# Patient Record
Sex: FEMALE | Race: WHITE | NOT HISPANIC OR LATINO | Employment: FULL TIME | URBAN - METROPOLITAN AREA
[De-identification: names, ages, dates, MRNs, and addresses within clinical notes are randomized per-mention and may not be internally consistent; named-entity substitution may affect disease eponyms.]

---

## 2019-07-01 ENCOUNTER — OFFICE VISIT (OUTPATIENT)
Dept: ENDOCRINOLOGY | Facility: CLINIC | Age: 39
End: 2019-07-01

## 2019-07-01 VITALS
BODY MASS INDEX: 25.95 KG/M2 | OXYGEN SATURATION: 99 % | HEART RATE: 81 BPM | WEIGHT: 141 LBS | HEIGHT: 62 IN | DIASTOLIC BLOOD PRESSURE: 70 MMHG | SYSTOLIC BLOOD PRESSURE: 104 MMHG

## 2019-07-01 DIAGNOSIS — E05.90 SUBCLINICAL HYPERTHYROIDISM: ICD-10-CM

## 2019-07-01 DIAGNOSIS — E04.2 MULTINODULAR GOITER: Primary | ICD-10-CM

## 2019-07-01 PROCEDURE — 84439 ASSAY OF FREE THYROXINE: CPT | Performed by: INTERNAL MEDICINE

## 2019-07-01 PROCEDURE — 99244 OFF/OP CNSLTJ NEW/EST MOD 40: CPT | Performed by: INTERNAL MEDICINE

## 2019-07-01 PROCEDURE — 84443 ASSAY THYROID STIM HORMONE: CPT | Performed by: INTERNAL MEDICINE

## 2019-07-01 PROCEDURE — 76536 US EXAM OF HEAD AND NECK: CPT | Performed by: INTERNAL MEDICINE

## 2019-07-01 PROCEDURE — 84480 ASSAY TRIIODOTHYRONINE (T3): CPT | Performed by: INTERNAL MEDICINE

## 2019-07-01 RX ORDER — CLONAZEPAM 0.5 MG/1
0.5 TABLET ORAL DAILY PRN
Refills: 0 | COMMUNITY
Start: 2019-06-10

## 2019-07-01 NOTE — PROGRESS NOTES
Thyroid Ultrasound Report  [facility]  [place], [place]    Date: 07/01/2019  Indication: multinodular goiter  Comparison Imaging: none  Clinical History: 39 y.o. Female with h/o subclinical hyperthyroidism and multinodular goiter. TSH suppressed to <0.005, freeT4 and T3 were normal.      Real time high resolution imaging of the thyroid gland was performed in transverse and longitudinal planes.  The right lobe measured 7.62 cm in Length x 2.97 cm in   AP diameter x 2.97 cm in TV dimension.  The isthmus measured 0.19 cm in thickness.  The left thyroid lobe measured 4.12 cm in length x 1.45 cm in AP diameter x 1.20 cm in TV dimension.    The thyroid gland appeared overall isoechoic with heterogeneous echotexture.    In the right mid to superior lobe, a 3.79(L) x 2.16(AP) x 3.18(T) cm isoechoic spongiform nodule was identified. The nodule had a smooth margin, peripheral and intranodal vascularity, and no microcalcifications.     In the right mid to inferior lobe, a 3.64(L) x 2.49(AP) x 2.61(T) cm isoechoic mostly solid nodule with a few small cystic areas at the periphery was identified. The nodule had a smooth margin, minimal peripheral vascularity, and no microcalcifications.     No discrete nodules were identified in the left lobe or isthmus.   No pathologic lymph nodes were seen.     IMPRESSION:   1) Asymmetric, mildly enlarged thyroid gland with the right lobe enlarged and the left lobe normal in size. Findings were consistent with a multinodular goiter.   2) No discrete nodules were identified in the left lobe or isthmus.  3) In the right mid to superior lobe, a 3.79(L) x 2.16(AP) x 3.18(T) cm isoechoic spongiform nodule was identified. The nodule had a smooth margin, peripheral and intranodal vascularity, and no microcalcifications. TI-Rads score of 1.   4) In the right mid to inferior lobe, a 3.64(L) x 2.49(AP) x 2.61(T) cm isoechoic mostly solid nodule was identified. The nodule had a smooth  margin, minimal peripheral vascularity, and no microcalcifications. TI-Rads score of 3.     RECOMMENDATIONS: Although the right mid to inferior lobe nodule meets criteria for FNA based on size, FNA would not be recommended if the nodule is autonomous. Based on the suppressed TSH, recommended NM Thyroid uptake and scan to further characterize the nodules.

## 2019-07-01 NOTE — PROGRESS NOTES
Chief Complaint   Patient presents with   • Subclinical hyperthyroidism     Consult for Luci COREAS        HPI:   Madai Olmos is a 39 y.o.female sent in consultation by LYUDMILA Cardenas for further evaluation of pt's subclinical hyperthyroidism. Her history is as follows:    - pt had a TSH collected in 01/2018 that she reports was slightly decreased. Pt was taking a high dose biotin supplement at that time. Her TSH was noted to be normal in 2017  - She then present to her PCP with increased anxiety in 01/2019. Evaluation included TFT's that were consistent with subclinical hyperthyroidism: (01/30/2019) TSH 0.01, free T4 1.3 (0.8 - 1.8). Pt recalls no biotin at that time, no URI or other illness.   - no imaging was completed at that time    - she continued to have anxiety, depression and OCD and was eventually prescribed clonazepam 0.5 mg BID  - Her PCP is concerned that her subclinical hyperthyroidism may be contributing to her anxiety    FMH: no known thyroid cancer, no known thyroid disease. (Grandmother had PHPTH)  PMH: no h/o irradiation of head, neck, or chest    Review of Systems   Constitutional:        Weight gain   HENT: Negative.    Eyes: Negative.    Respiratory: Negative.    Cardiovascular: Negative.    Gastrointestinal: Negative.    Endocrine: Positive for heat intolerance.   Genitourinary: Negative.    Musculoskeletal: Positive for myalgias.   Skin: Negative.    Allergic/Immunologic: Negative.    Neurological: Positive for weakness and numbness. Negative for tremors.   Hematological: Negative.    Psychiatric/Behavioral: Negative.  Negative for suicidal ideas.        Depression, anxiety     Past Medical History:   Diagnosis Date   • Depression    • Heart murmur    • Migraine    • OCD (obsessive compulsive disorder)    • Panic anxiety syndrome      family history includes Arthritis in her maternal grandmother; Cancer in her maternal great-grandmother, mother, paternal grandmother, and sister;  "Diabetes in her maternal grandfather; Heart attack in her maternal grandfather and paternal grandfather; Hyperlipidemia in her father; Hypertension in her maternal great-grandmother, paternal grandfather, and paternal grandmother; Mental illness in her brother, mother, and sister; Migraines in her brother, mother, and sister; Osteoporosis in her maternal grandmother and paternal grandmother; Thyroid disease in her maternal grandmother.  Past Surgical History:   Procedure Laterality Date   • MOUTH SURGERY  2003    x 2 2011   • TUBAL ABDOMINAL LIGATION  10/2009     Social History     Tobacco Use   • Smoking status: Former Smoker   • Smokeless tobacco: Never Used   Substance Use Topics   • Alcohol use: Yes     Alcohol/week: 0.6 oz     Types: 1 Glasses of wine per week   • Drug use: No     Outpatient Medications Prior to Visit   Medication Sig Dispense Refill   • clonazePAM (KlonoPIN) 0.5 MG tablet Take 0.5 mg by mouth 2 (Two) Times a Day As Needed.  0     No facility-administered medications prior to visit.      Allergies   Allergen Reactions   • Keflex [Cephalexin] Diarrhea     Patient states that she gets migraines with dizziness and tongue swelling        /70   Pulse 81   Ht 156.2 cm (61.5\")   Wt 64 kg (141 lb)   SpO2 99%   BMI 26.21 kg/m²   Physical Exam   Constitutional: She is oriented to person, place, and time. She appears well-developed. No distress.   HENT:   Head: Normocephalic.   Mouth/Throat: Oropharynx is clear and moist.   Eyes: Conjunctivae and EOM are normal. Pupils are equal, round, and reactive to light.   Neck: No tracheal deviation present. Thyromegaly (right lobe enlarged, left lobe small) present.   No palpable thyroid nodules     Cardiovascular: Normal rate, regular rhythm and normal heart sounds.   No murmur heard.  Pulmonary/Chest: Effort normal and breath sounds normal. No respiratory distress.   Abdominal: Soft. Bowel sounds are normal. She exhibits no mass. There is no " tenderness.   Lymphadenopathy:     She has no cervical adenopathy.   Neurological: She is alert and oriented to person, place, and time. No cranial nerve deficit.   Skin: Skin is warm and dry. She is not diaphoretic. No erythema.   Psychiatric: She has a normal mood and affect. Her behavior is normal.   Vitals reviewed.    LABS/IMAGING: outside records reviewed and summarized in HPI  Results for orders placed or performed in visit on 07/01/19   T3   Result Value Ref Range    T3, Total 167.0 80.0 - 200.0 ng/dl   T4, Free   Result Value Ref Range    Free T4 1.49 0.93 - 1.70 ng/dL   TSH   Result Value Ref Range    TSH <0.005 (L) 0.270 - 4.200 mIU/mL     PROCEDURES (in office):    Thyroid Ultrasound Report  [facility]  [place], [place]    Date: 07/01/2019  Indication: multinodular goiter  Comparison Imaging: none  Clinical History: 39 y.o. Female with h/o subclinical hyperthyroidism and multinodular goiter. TSH suppressed to <0.005, freeT4 and T3 were normal.      Real time high resolution imaging of the thyroid gland was performed in transverse and longitudinal planes.  The right lobe measured 7.62 cm in Length x 2.97 cm in   AP diameter x 2.97 cm in TV dimension.  The isthmus measured 0.19 cm in thickness.  The left thyroid lobe measured 4.12 cm in length x 1.45 cm in AP diameter x 1.20 cm in TV dimension.    The thyroid gland appeared overall isoechoic with heterogeneous echotexture.    In the right mid to superior lobe, a 3.79(L) x 2.16(AP) x 3.18(T) cm isoechoic spongiform nodule was identified. The nodule had a smooth margin, peripheral and intranodal vascularity, and no microcalcifications.     In the right mid to inferior lobe, a 3.64(L) x 2.49(AP) x 2.61(T) cm isoechoic mostly solid nodule with a few small cystic areas at the periphery was identified. The nodule had a smooth margin, minimal peripheral vascularity, and no microcalcifications.     No discrete nodules were identified in the left  lobe or isthmus.   No pathologic lymph nodes were seen.     IMPRESSION:   1) Asymmetric, mildly enlarged thyroid gland with the right lobe enlarged and the left lobe normal in size. Findings were consistent with a multinodular goiter.   2) No discrete nodules were identified in the left lobe or isthmus.  3) In the right mid to superior lobe, a 3.79(L) x 2.16(AP) x 3.18(T) cm isoechoic spongiform nodule was identified. The nodule had a smooth margin, peripheral and intranodal vascularity, and no microcalcifications. TI-Rads score of 1.   4) In the right mid to inferior lobe, a 3.64(L) x 2.49(AP) x 2.61(T) cm isoechoic mostly solid nodule was identified. The nodule had a smooth margin, minimal peripheral vascularity, and no microcalcifications. TI-Rads score of 3.     RECOMMENDATIONS: Although the right mid to inferior lobe nodule meets criteria for FNA based on size, FNA would not be recommended if the nodule is autonomous. Based on the suppressed TSH, recommended NM Thyroid uptake and scan to further characterize the nodules.       ASSESSMENT/PLAN:  1) multinodular goiter: Thyroid US completed in clinic today showed -   - Asymmetric, mildly enlarged thyroid gland with the right lobe enlarged and the left lobe normal in size. Findings were consistent with a multinodular goiter.   - No discrete nodules were identified in the left lobe or isthmus.  - In the right mid to superior lobe, a 3.79(L) x 2.16(AP) x 3.18(T) cm isoechoic spongiform nodule was identified. The nodule had a smooth margin, peripheral and intranodal vascularity, and no microcalcifications. TI-Rads score of 1.   - In the right mid to inferior lobe, a 3.64(L) x 2.49(AP) x 2.61(T) cm isoechoic mostly solid nodule was identified. The nodule had a smooth margin, minimal peripheral vascularity, and no microcalcifications. TI-Rads score of 3.     RECOMMENDATIONS: Although the right mid to inferior lobe nodule meets criteria for FNA based on size, FNA would  not be recommended if the nodule is autonomous. Based on the suppressed TSH, recommended NM Thyroid uptake and scan to further characterize the nodules.     - Will have patient complete a NM thyroid uptake and scan. After review of imaging, will determine in FNA is indicated.     2) subclinical hyperthyroidism:   - labs checked today again show subclinical hyperthyroidism (suppressed TSH with a normal fT4 and total T3)  - the etiology is multinodular goiter  - discussed with patient that it is unlikely her subclinical hyperthyroidism is contributing to her anxiety. However, a trial of methimazole could be considered to see if symptoms improve with normalization of her TSH. Will have pt complete the NM thyroid uptake and scan first. After review of imaging, will then be able to determine if a trial of methimazole is possible.      RTC 6 months    Counseling was given to patient for the following topics:  diagnostic results and impressions and clinical significance of thyroid nodules, risk factors for thyroid cancer, indications for FNA, and reviewing US images with patient   , see details in assessment/plan. Total face to face time of the encounter was 60 minutes and 45 minutes was spent counseling.

## 2019-07-02 LAB
T3 SERPL-MCNC: 167 NG/DL (ref 80–200)
T4 FREE SERPL-MCNC: 1.49 NG/DL (ref 0.93–1.7)
TSH SERPL DL<=0.05 MIU/L-ACNC: <0.005 MIU/ML (ref 0.27–4.2)

## 2019-07-18 ENCOUNTER — TELEPHONE (OUTPATIENT)
Dept: ENDOCRINOLOGY | Facility: CLINIC | Age: 39
End: 2019-07-18

## 2019-07-24 PROBLEM — E04.2 MULTINODULAR GOITER: Status: ACTIVE | Noted: 2019-07-24

## 2019-07-24 PROBLEM — E05.90 SUBCLINICAL HYPERTHYROIDISM: Status: ACTIVE | Noted: 2019-07-24

## 2019-08-07 ENCOUNTER — HOSPITAL ENCOUNTER (OUTPATIENT)
Dept: NUCLEAR MEDICINE | Facility: HOSPITAL | Age: 39
Discharge: HOME | End: 2019-08-07

## 2019-08-07 DIAGNOSIS — E05.90 SUBCLINICAL HYPERTHYROIDISM: ICD-10-CM

## 2019-08-07 DIAGNOSIS — E04.2 MULTINODULAR GOITER: ICD-10-CM

## 2019-08-07 PROCEDURE — 0 SODIUM IODIDE 3.7 MBQ CAPSULE: Performed by: INTERNAL MEDICINE

## 2019-08-07 PROCEDURE — 78014 THYROID IMAGING W/BLOOD FLOW: CPT

## 2019-08-07 PROCEDURE — A9516 IODINE I-123 SOD IODIDE MIC: HCPCS | Performed by: INTERNAL MEDICINE

## 2019-08-07 RX ORDER — SODIUM IODIDE I 123 100 UCI/1
1 CAPSULE, GELATIN COATED ORAL
Status: COMPLETED | OUTPATIENT
Start: 2019-08-07 | End: 2019-08-07

## 2019-08-07 RX ADMIN — Medication 1 CAPSULE: at 12:20

## 2019-08-08 ENCOUNTER — HOSPITAL ENCOUNTER (OUTPATIENT)
Dept: NUCLEAR MEDICINE | Facility: HOSPITAL | Age: 39
Discharge: HOME | End: 2019-08-08

## 2019-08-19 ENCOUNTER — TELEPHONE (OUTPATIENT)
Dept: INTERNAL MEDICINE | Facility: CLINIC | Age: 39
End: 2019-08-19

## 2019-08-19 NOTE — TELEPHONE ENCOUNTER
PATIENT RECEIVED AN EMAIL FROM THE HOSPITAL IN REGARDS TO HER UPTAKE THAT SHE HAD DONE AND SHE WAS WONDERING IF DR FOLEY HAD REVIEW THE RESULTS YET AND WHEN WOULD SHE LIKE FOR HER TO COME BACK IN FOR AN APPOINTMENT? THE PATIENT WOULD LIKE TO GET A CALL BACK [phone]

## 2019-08-19 NOTE — TELEPHONE ENCOUNTER
Spoke to patient about NM thyroid uptake and scan results. 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 After discussion of options, will monitor for now. No medication at this time.  Ritu Marino MD

## 2020-01-13 ENCOUNTER — OFFICE VISIT (OUTPATIENT)
Dept: ENDOCRINOLOGY | Facility: CLINIC | Age: 40
End: 2020-01-13

## 2020-01-13 VITALS
OXYGEN SATURATION: 98 % | SYSTOLIC BLOOD PRESSURE: 110 MMHG | HEART RATE: 95 BPM | HEIGHT: 62 IN | BODY MASS INDEX: 25.03 KG/M2 | DIASTOLIC BLOOD PRESSURE: 80 MMHG | WEIGHT: 136 LBS

## 2020-01-13 DIAGNOSIS — E04.2 MULTINODULAR GOITER: ICD-10-CM

## 2020-01-13 DIAGNOSIS — E05.90 SUBCLINICAL HYPERTHYROIDISM: Primary | ICD-10-CM

## 2020-01-13 LAB
T3 SERPL-MCNC: 167 NG/DL (ref 80–200)
T4 FREE SERPL-MCNC: 1.32 NG/DL (ref 0.93–1.7)
TSH SERPL DL<=0.05 MIU/L-ACNC: 0.01 UIU/ML (ref 0.27–4.2)

## 2020-01-13 PROCEDURE — 84480 ASSAY TRIIODOTHYRONINE (T3): CPT | Performed by: INTERNAL MEDICINE

## 2020-01-13 PROCEDURE — 84439 ASSAY OF FREE THYROXINE: CPT | Performed by: INTERNAL MEDICINE

## 2020-01-13 PROCEDURE — 84443 ASSAY THYROID STIM HORMONE: CPT | Performed by: INTERNAL MEDICINE

## 2020-01-13 PROCEDURE — 99213 OFFICE O/P EST LOW 20 MIN: CPT | Performed by: INTERNAL MEDICINE

## 2020-01-13 NOTE — PROGRESS NOTES
Chief Complaint   Patient presents with   • Multinodular goiter     f/u    • subclinical hyperthyroidism     f/u       HPI:   Madai Olmos is a 39 y.o.female who returns to Endocrine Clinic for f/u evaluation of pt's subclinical hyperthyroidism and multinodular goiter. Last visit 07/01/2019. Her history is as follows:    Interim Events:   - pt stopped her Zoloft prescription  - pt now taking clonazepam as needed  - she reports her OCD, anxiety has improved since 07/2019 without new any medication intervention     1) subclinical hyperthyroidism:  - pt had a TSH collected in 01/2018 that she reports was slightly decreased. Pt was taking a high dose biotin supplement at that time. Her TSH was noted to be normal in 2017  - She then presented to her PCP with increased anxiety in 01/2019. Evaluation included TFT's that were consistent with subclinical hyperthyroidism: (01/30/2019) TSH 0.01, free T4 1.3 (0.8 - 1.8). Pt recalls no biotin at that time, no URI or other illness.   - no imaging was completed at that time    - she continued to have anxiety, depression and OCD and was eventually prescribed clonazepam 0.5 mg BID. Had been on Zoloft in the past  - Her PCP was concerned that her subclinical hyperthyroidism was contributing to her anxiety prompting consultation.    Initial Endocrine Visit: (07/01/2019):  - labs checked again showed subclinical hyperthyroidism (TSH < 0.005, normal free T4 of 1.49 and total T3 of 167.0)  - the etiology was found to be multinodular goiter  - discussed with patient that it is unlikely her subclinical hyperthyroidism was contributing to her anxiety. However, a trial of methimazole could be considered to see if symptoms improve with normalization of her TSH.   - pt did not want to take methimazole and reported her symptoms had been slowly improving with the clonazepam.    2) non-obstructive MNG:    Thyroid US completed in clinic (07/2019) showed -   - Asymmetric, mildly enlarged thyroid  "gland with the right lobe enlarged and the left lobe normal in size. Findings were consistent with a multinodular goiter.   - No discrete nodules were identified in the left lobe or isthmus.  - In the right mid to superior lobe, a 3.79(L) x 2.16(AP) x 3.18(T) cm isoechoic spongiform nodule was identified. The nodule had a smooth margin, peripheral and intranodal vascularity, and no microcalcifications. TI-Rads score of 1.   - In the right mid to inferior lobe, a 3.64(L) x 2.49(AP) x 2.61(T) cm isoechoic mostly solid nodule was identified. The nodule had a smooth margin, minimal peripheral vascularity, and no microcalcifications. TI-Rads score of 3.   RECOMMENDATIONS: Although the right mid to inferior lobe nodule meets criteria for FNA based on size, FNA would not be recommended if the nodule is autonomous. Based on the suppressed TSH, recommended NM Thyroid uptake and scan to further characterize the nodules.     NM Thyroid Uptake and Scan (08/08/2019,  radiology): \"1. Focal uptake in the right thyroid lobe and suppressed appearance of  left thyroid lobe.2. Four hour thyroid uptake at the far upper limits of normal, this combination of findings suggest toxic nodular goiter.\"  \"Thyroid uptake is at the far upper limits of normal for 4 hours at 20%,and mid range normal at 24 hours at 27%\"    FMH: no known thyroid cancer, no known thyroid disease. (Grandmother had PHPTH)  PMH: no h/o irradiation of head, neck, or chest    Review of Systems   Constitutional:        Weight loss, mild   HENT: Negative.    Eyes: Negative.    Respiratory: Negative.    Cardiovascular: Negative.    Gastrointestinal: Negative.    Endocrine: Negative.  Negative for heat intolerance.   Genitourinary: Negative.    Musculoskeletal: Positive for myalgias.   Skin: Negative.    Allergic/Immunologic: Negative.    Neurological: Positive for weakness and numbness. Negative for tremors.   Hematological: Negative.    Psychiatric/Behavioral: Negative.  " "Negative for suicidal ideas.        Depression, anxiety: improving     The following portions of the patient's history were reviewed and updated as appropriate: allergies, current medications, past family history, past medical history, past social history, past surgical history and problem list.    /80   Pulse 95   Ht 156.2 cm (61.5\")   Wt 61.7 kg (136 lb)   SpO2 98%   BMI 25.28 kg/m²   Physical Exam   Constitutional: She is oriented to person, place, and time. She appears well-developed. No distress.   HENT:   Head: Normocephalic.   Mouth/Throat: Oropharynx is clear and moist.   Eyes: Pupils are equal, round, and reactive to light. Conjunctivae and EOM are normal.   Neck: No tracheal deviation present. Thyromegaly (right lobe enlarged, left lobe small) present.   No palpable thyroid nodules     Cardiovascular: Normal rate, regular rhythm and normal heart sounds.   No murmur heard.  Pulmonary/Chest: Effort normal and breath sounds normal. No respiratory distress.   Abdominal: Soft. Bowel sounds are normal. She exhibits no mass. There is no tenderness.   Lymphadenopathy:     She has no cervical adenopathy.   Neurological: She is alert and oriented to person, place, and time. No cranial nerve deficit.   Skin: Skin is warm and dry. She is not diaphoretic. No erythema.   Psychiatric: She has a normal mood and affect. Her behavior is normal.   Vitals reviewed.    LABS/IMAGING: outside records reviewed and summarized in HPI  Results for orders placed or performed in visit on 01/13/20   TSH   Result Value Ref Range    TSH 0.006 (L) 0.270 - 4.200 uIU/mL   T4, Free   Result Value Ref Range    Free T4 1.32 0.93 - 1.70 ng/dL   T3   Result Value Ref Range    T3, Total 167.0 80.0 - 200.0 ng/dl     ASSESSMENT/PLAN:   1) subclinical hyperthyroidism:   - labs checked today again show subclinical hyperthyroidism (suppressed TSH with a normal fT4 and total T3)  - the etiology is multinodular goiter as seen on Thyroid US " and Thyroid uptake and scan in 2019  - discussed with patient that it is unlikely her subclinical hyperthyroidism is contributing to her anxiety. Pt also reports feeling better since last visit and has tapered her anxiety medications.   - recommended yearly TSH, free T4, and total T3 (off of biotin supplements) to monitor for progression  - Reviewed signs and symptoms of overt hyperthyroidism and instructed her to call if she develops concerning symptoms so that labs can be completed.     2) multinodular goiter:   - Thyroid US completed in clinic 07/2019 reviewed   - NM Thyroid uptake and scan from 08/2019 reviewed.   - Findings show that the right lobe nodules are hyperfunctioning.  FNA is not be recommended as these nodules are autonomously functioning.   - Will plan to repeat Thyroid US in 2021    RTC 12 months

## 2020-01-27 ENCOUNTER — TELEPHONE (OUTPATIENT)
Dept: ENDOCRINOLOGY | Facility: CLINIC | Age: 40
End: 2020-01-27

## 2021-05-19 ENCOUNTER — OFFICE VISIT (OUTPATIENT)
Dept: ENDOCRINOLOGY | Facility: CLINIC | Age: 41
End: 2021-05-19

## 2021-05-19 ENCOUNTER — LAB (OUTPATIENT)
Dept: LAB | Facility: HOSPITAL | Age: 41
End: 2021-05-19

## 2021-05-19 VITALS
SYSTOLIC BLOOD PRESSURE: 126 MMHG | HEART RATE: 92 BPM | BODY MASS INDEX: 22.66 KG/M2 | HEIGHT: 61 IN | OXYGEN SATURATION: 99 % | DIASTOLIC BLOOD PRESSURE: 80 MMHG | WEIGHT: 120 LBS

## 2021-05-19 DIAGNOSIS — E05.20 TOXIC NODULAR GOITER W/O CRISIS: Primary | ICD-10-CM

## 2021-05-19 PROCEDURE — 84480 ASSAY TRIIODOTHYRONINE (T3): CPT | Performed by: INTERNAL MEDICINE

## 2021-05-19 PROCEDURE — 84439 ASSAY OF FREE THYROXINE: CPT | Performed by: INTERNAL MEDICINE

## 2021-05-19 PROCEDURE — 99214 OFFICE O/P EST MOD 30 MIN: CPT | Performed by: INTERNAL MEDICINE

## 2021-05-19 PROCEDURE — 83520 IMMUNOASSAY QUANT NOS NONAB: CPT | Performed by: INTERNAL MEDICINE

## 2021-05-19 PROCEDURE — 84443 ASSAY THYROID STIM HORMONE: CPT | Performed by: INTERNAL MEDICINE

## 2021-05-19 RX ORDER — FAMOTIDINE 20 MG/1
20 TABLET, FILM COATED ORAL DAILY PRN
COMMUNITY
Start: 2021-05-03 | End: 2022-03-23

## 2021-05-20 LAB
T3 SERPL-MCNC: 162 NG/DL (ref 80–200)
T4 FREE SERPL-MCNC: 1.73 NG/DL (ref 0.93–1.7)
TSH SERPL DL<=0.05 MIU/L-ACNC: <0.005 UIU/ML (ref 0.27–4.2)

## 2021-05-21 ENCOUNTER — TELEPHONE (OUTPATIENT)
Dept: ENDOCRINOLOGY | Facility: CLINIC | Age: 41
End: 2021-05-21

## 2021-05-21 RX ORDER — METHIMAZOLE 5 MG/1
5 TABLET ORAL DAILY
Qty: 90 TABLET | Refills: 1 | Status: SHIPPED | OUTPATIENT
Start: 2021-05-21 | End: 2021-08-18

## 2021-05-21 NOTE — TELEPHONE ENCOUNTER
Spoke to patient about lab results. See clinic note from 05/19/2021 for details.   Signed: Ritu Marino MD

## 2021-05-23 LAB — TSH RECEP AB SER-ACNC: <1.1 IU/L (ref 0–1.75)

## 2021-06-01 DIAGNOSIS — E05.20 TOXIC NODULAR GOITER W/O CRISIS: Primary | ICD-10-CM

## 2021-06-02 ENCOUNTER — LAB (OUTPATIENT)
Dept: LAB | Facility: HOSPITAL | Age: 41
End: 2021-06-02

## 2021-06-02 ENCOUNTER — LAB (OUTPATIENT)
Dept: ENDOCRINOLOGY | Facility: CLINIC | Age: 41
End: 2021-06-02

## 2021-06-02 DIAGNOSIS — E05.20 TOXIC NODULAR GOITER W/O CRISIS: ICD-10-CM

## 2021-06-02 LAB
T4 FREE SERPL-MCNC: 1.11 NG/DL (ref 0.93–1.7)
TSH SERPL DL<=0.05 MIU/L-ACNC: 0.01 UIU/ML (ref 0.27–4.2)

## 2021-06-02 PROCEDURE — 84439 ASSAY OF FREE THYROXINE: CPT

## 2021-06-02 PROCEDURE — 84443 ASSAY THYROID STIM HORMONE: CPT

## 2021-06-21 NOTE — PROGRESS NOTES
Thyroid Ultrasound Report  [facility]  [place], [place]    Date: 05/19/2021  Indication: multinodular goiter  Comparison Imaging: Endocrine Clinic Thyroid US (07/01/2019), NM Thyroid Uptake and scan (08/08/2019)  Clinical History: 40 y.o. Female with h/o subclinical hyperthyroidism and multinodular goiter. NM Thyroid uptake ans scan showing autonomous nodules in the right lobs. Labs today show overt hyperthyroidism.    Real time high resolution imaging of the thyroid gland was performed in transverse and longitudinal planes. The right lobe measured 8.15 cm in Length x 3.08 cm in AP diameter x 3.28 cm in TV dimension.  The isthmus measured 0.23 cm in thickness.  The left thyroid lobe measured 4.24 cm in length x 1.57 cm in AP diameter x 1.23 cm in TV dimension.    The thyroid gland appeared overall isoechoic with heterogeneous echotexture.    In the right mid to superior lobe, a 3.96(L) x 2.07(AP) x 2.97(T) cm isoechoic spongiform nodule was again identified. The nodule had a smooth margin, peripheral and intranodal vascularity, and no microcalcifications.     In the right mid to inferior lobe, a 3.84(L) x 3.11(AP) x 3.01(T) cm isoechoic mostly solid nodule with a few small cystic areas at the periphery was again identified. The nodule had a smooth margin, peripheral vascularity, and no microcalcifications.     No discrete nodules were identified in the isthmus.    A subcentimeter (0.56 cm) isoechoic mixed solid and cystic nodule was identified. The nodule had a smooth margin, no vascularity, and no microcalcifications.     No pathologic lymph nodes were seen.     IMPRESSION:   1) Asymmetric, mildly enlarged thyroid gland with the right lobe enlarged and the left lobe normal in size. Findings were consistent with a multinodular goiter.   2) A subcentimeter (0.56 cm) isoechoic mixed solid and cystic nodule was identified.  3) In the right mid to superior lobe, a 3.96(L) x 2.07(AP) x 2.97(T) cm isoechoic  spongiform nodule was again identified. The nodule had a smooth margin, peripheral and intranodal vascularity, and no microcalcifications. TI-Rads score of 1.   4) In the right mid to inferior lobe, a 3.84(L) x 3.11(AP) x 3.01(T) cm isoechoic mostly solid nodule with a few small cystic areas at the periphery was again identified. The nodule had a smooth margin, peripheral vascularity, and no microcalcifications.  TI-Rads score of 3.     RECOMMENDATIONS: Although the right mid to inferior lobe nodule meets criteria for FNA based on size, FNA was not recommended as the nodule is autonomous.

## 2021-06-21 NOTE — PROGRESS NOTES
Chief Complaint   Patient presents with   • Thyroid Nodule     Follow up       HPI:   Madai Olmos is a 40 y.o.female who returns to Endocrine Clinic for f/u evaluation of pt's subclinical hyperthyroidism and multinodular goiter. Last visit 01/13/2020. Her history is as follows:    Interim Events:   - pt reports developing palpitations and tachycardia.  Her apple watch reported possible atrial fibrillation.  She presented to an outside ER with tachycardia and chest pain. Was found to have sinus tachycardia.   - Had been vaping and recently stopped.   - is scheduled to see cardiology for further evaluation     1) h/o subclinical hyperthyroidism:  - pt had a TSH collected in 01/2018 that she reports was slightly decreased. Pt was taking a high dose biotin supplement at that time. Her TSH was noted to be normal in 2017  - She then presented to her PCP with increased anxiety in 01/2019. Evaluation included TFT's that were consistent with subclinical hyperthyroidism: (01/30/2019) TSH 0.01, free T4 1.3 (0.8 - 1.8). Pt recalls no biotin at that time, no URI or other illness.   - no imaging was completed at that time    - she continued to have anxiety, depression and OCD and was eventually prescribed clonazepam 0.5 mg BID. Had been on Zoloft in the past  - Her PCP was concerned that her subclinical hyperthyroidism was contributing to her anxiety prompting consultation.    Initial Endocrine Visit: (07/01/2019):  - labs checked again showed subclinical hyperthyroidism (TSH < 0.005, normal free T4 of 1.49 and total T3 of 167.0)  - the etiology was found to be multinodular goiter  - discussed with patient that it is unlikely her subclinical hyperthyroidism was contributing to her anxiety. However, a trial of methimazole could be considered to see if symptoms improve with normalization of her TSH.   - pt did not want to take methimazole and reported her symptoms had been slowly improving with the clonazepam.    2)  "non-obstructive MNG:    Thyroid US completed in clinic (07/2019) showed -   - Asymmetric, mildly enlarged thyroid gland with the right lobe enlarged and the left lobe normal in size. Findings were consistent with a multinodular goiter.   - No discrete nodules were identified in the left lobe or isthmus.  - In the right mid to superior lobe, a 3.79(L) x 2.16(AP) x 3.18(T) cm isoechoic spongiform nodule was identified. The nodule had a smooth margin, peripheral and intranodal vascularity, and no microcalcifications. TI-Rads score of 1.   - In the right mid to inferior lobe, a 3.64(L) x 2.49(AP) x 2.61(T) cm isoechoic mostly solid nodule was identified. The nodule had a smooth margin, minimal peripheral vascularity, and no microcalcifications. TI-Rads score of 3.   RECOMMENDATIONS: Although the right mid to inferior lobe nodule meets criteria for FNA based on size, FNA would not be recommended if the nodule is autonomous. Based on the suppressed TSH, recommended NM Thyroid uptake and scan to further characterize the nodules.     NM Thyroid Uptake and Scan (08/08/2019,  radiology): \"1. Focal uptake in the right thyroid lobe and suppressed appearance of  left thyroid lobe.2. Four hour thyroid uptake at the far upper limits of normal, this combination of findings suggest toxic nodular goiter.\"  \"Thyroid uptake is at the far upper limits of normal for 4 hours at 20%,and mid range normal at 24 hours at 27%\"    FMH: no known thyroid cancer, no known thyroid disease. (Grandmother had PHPTH)  PMH: no h/o irradiation of head, neck, or chest    Review of Systems   Constitutional: Positive for unexpected weight change.        Weight loss, 16 lbs since 01/2020   HENT: Negative.    Eyes: Negative.    Respiratory: Negative.    Cardiovascular: Positive for palpitations.   Gastrointestinal: Negative.    Endocrine: Negative.  Negative for heat intolerance.   Genitourinary: Negative.    Musculoskeletal: Positive for myalgias.   Skin: " "Negative.    Allergic/Immunologic: Negative.    Neurological: Positive for tremors.   Hematological: Negative.    Psychiatric/Behavioral: Negative.  Negative for suicidal ideas.        H/o Depression, anxiety     The following portions of the patient's history were reviewed and updated as appropriate: allergies, current medications, past family history, past medical history, past social history, past surgical history and problem list.    /80   Pulse 92   Ht 154.9 cm (61\")   Wt 54.4 kg (120 lb)   SpO2 99%   BMI 22.67 kg/m²   Physical Exam   Constitutional: She is oriented to person, place, and time. She appears well-developed. No distress.   HENT:   Head: Normocephalic.   Eyes: Pupils are equal, round, and reactive to light. Conjunctivae are normal.   Neck: No tracheal deviation present. Thyromegaly (right lobe enlarged, left lobe small) present.   No palpable thyroid nodules     Cardiovascular: Normal rate, regular rhythm and normal heart sounds.   No murmur heard.  Pulmonary/Chest: Effort normal and breath sounds normal. No respiratory distress.   Abdominal: Soft. Bowel sounds are normal. She exhibits no mass. There is no abdominal tenderness.   Lymphadenopathy:     She has no cervical adenopathy.   Neurological: She is alert and oriented to person, place, and time. No cranial nerve deficit.   Skin: Skin is warm and dry. She is not diaphoretic. No erythema.   Psychiatric: Her behavior is normal.   Vitals reviewed.    LABS/IMAGING: outside records reviewed and summarized in HPI  Results for orders placed or performed in visit on 05/19/21   Thyrotropin Receptor Antibody    Specimen: Blood   Result Value Ref Range    Thyrotropin Receptor Antibody <1.10 0.00 - 1.75 IU/L   TSH    Specimen: Blood   Result Value Ref Range    TSH <0.005 (L) 0.270 - 4.200 uIU/mL   T3    Specimen: Blood   Result Value Ref Range    T3, Total 162.0 80.0 - 200.0 ng/dl   T4, Free    Specimen: Blood   Result Value Ref Range    Free T4 " 1.73 (H) 0.93 - 1.70 ng/dL     PROCEDURE (in office):    Thyroid Ultrasound Report  [facility]  [place], [place]    Date: 05/19/2021  Indication: multinodular goiter  Comparison Imaging: Endocrine Clinic Thyroid US (07/01/2019), NM Thyroid Uptake and scan (08/08/2019)  Clinical History: 40 y.o. Female with h/o subclinical hyperthyroidism and multinodular goiter. NM Thyroid uptake ans scan showing autonomous nodules in the right lobs. Labs today show overt hyperthyroidism.    Real time high resolution imaging of the thyroid gland was performed in transverse and longitudinal planes. The right lobe measured 8.15 cm in Length x 3.08 cm in AP diameter x 3.28 cm in TV dimension.  The isthmus measured 0.23 cm in thickness.  The left thyroid lobe measured 4.24 cm in length x 1.57 cm in AP diameter x 1.23 cm in TV dimension.    The thyroid gland appeared overall isoechoic with heterogeneous echotexture.    In the right mid to superior lobe, a 3.96(L) x 2.07(AP) x 2.97(T) cm isoechoic spongiform nodule was again identified. The nodule had a smooth margin, peripheral and intranodal vascularity, and no microcalcifications.     In the right mid to inferior lobe, a 3.84(L) x 3.11(AP) x 3.01(T) cm isoechoic mostly solid nodule with a few small cystic areas at the periphery was again identified. The nodule had a smooth margin, peripheral vascularity, and no microcalcifications.     No discrete nodules were identified in the isthmus.    A subcentimeter (0.56 cm) isoechoic mixed solid and cystic nodule was identified. The nodule had a smooth margin, no vascularity, and no microcalcifications.     No pathologic lymph nodes were seen.     IMPRESSION:   1) Asymmetric, mildly enlarged thyroid gland with the right lobe enlarged and the left lobe normal in size. Findings were consistent with a multinodular goiter.   2) A subcentimeter (0.56 cm) isoechoic mixed solid and cystic nodule was identified.  3) In the right mid to  superior lobe, a 3.96(L) x 2.07(AP) x 2.97(T) cm isoechoic spongiform nodule was again identified. The nodule had a smooth margin, peripheral and intranodal vascularity, and no microcalcifications. TI-Rads score of 1.   4) In the right mid to inferior lobe, a 3.84(L) x 3.11(AP) x 3.01(T) cm isoechoic mostly solid nodule with a few small cystic areas at the periphery was again identified. The nodule had a smooth margin, peripheral vascularity, and no microcalcifications.  TI-Rads score of 3.     RECOMMENDATIONS: Although the right mid to inferior lobe nodule meets criteria for FNA based on size, FNA was not recommended as the nodule is autonomous.     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 TSH Receptor Ab is negative - no underlying Graves disease.    - discussed with patient that her recent tachycardia and palpitations are likely due to her toxic nodular goiter. Not clear if she is having Atrial fibrillation, but discussed that she is at risk for cardiac dysrhythmias with untreated hyperthyroidism.    Recommended she start methimazole 5 mg daily.     ADDENDUM: Pt took methimazole for only 2 weeks. She stopped on 06/02/2021 due to low heart rate in the 60's. Off methimazole pt reported low blood pressure as well as c/o night sweats, right arm/leg numbness.     TFT's repeated (06/02/2021) TSH 0.007, free T4 1.11.   Discussed with patient that usually toxic nodular goiter needs treatment for at least 6 months as she is at risk for developing overt hyperthyroidism again leading to cardiac dysrhythmia.  The majority of symptoms she experienced occurred off of the methimazole. Patient states she is very hesitant to take any medication and does not want to take the methimazole or  PTU since her levels are back in the subclinical range.  Reviewed with patient the signs and symptoms of overt hyperthyroidism.  Instructed patient to call if she develops the symptoms again so that her thyroid function levels can be checked.  Reviewed the risks of cardiac dysrhythmias in the setting of untreated hyperthyroidism.  including paroxysmal atrial fibrillation which would require anticoagulation       - Thyroid US completed in clinic 07/2019 reviewed   - NM Thyroid uptake and scan from 08/2019 reviewed.   - Findings show that the right lobe nodules are hyperfunctioning.  FNA is not be recommended as these nodules are autonomously functioning.     Repeat thyroid US completed today showed -   - Asymmetric, mildly enlarged thyroid gland with the right lobe enlarged and the left lobe normal in size. Findings were consistent with a multinodular goiter.   - A subcentimeter (0.56 cm) isoechoic mixed solid and cystic nodule was identified.  - In the right mid to superior lobe, a 3.96(L) x 2.07(AP) x 2.97(T) cm isoechoic spongiform nodule was again identified. The nodule had a smooth margin, peripheral and intranodal vascularity, and no microcalcifications. TI-Rads score of 1.   - In the right mid to inferior lobe, a 3.84(L) x 3.11(AP) x 3.01(T) cm isoechoic mostly solid nodule with a few small cystic areas at the periphery was again identified. The nodule had a smooth margin, peripheral vascularity, and no microcalcifications.  TI-Rads score of 3.     RECOMMENDATIONS: Although the right mid to inferior lobe nodule meets criteria for FNA based on size, FNA was not recommended as the nodule is autonomous.     RTC 3 months    Signed: Ritu Marino MD    Counseling was given to patient for the following topics:  impressions and risks and benefits of treatment options, see details in assessment/plan. Total face to face time of the encounter was 30 minutes and 20 minutes was spent counseling.

## 2021-08-18 ENCOUNTER — OFFICE VISIT (OUTPATIENT)
Dept: ENDOCRINOLOGY | Facility: CLINIC | Age: 41
End: 2021-08-18

## 2021-08-18 ENCOUNTER — LAB (OUTPATIENT)
Dept: LAB | Facility: HOSPITAL | Age: 41
End: 2021-08-18

## 2021-08-18 VITALS
OXYGEN SATURATION: 98 % | BODY MASS INDEX: 23.03 KG/M2 | WEIGHT: 122 LBS | HEIGHT: 61 IN | SYSTOLIC BLOOD PRESSURE: 118 MMHG | DIASTOLIC BLOOD PRESSURE: 70 MMHG | HEART RATE: 68 BPM

## 2021-08-18 DIAGNOSIS — E04.2 MULTINODULAR GOITER: ICD-10-CM

## 2021-08-18 DIAGNOSIS — E05.90 SUBCLINICAL HYPERTHYROIDISM: Primary | ICD-10-CM

## 2021-08-18 PROCEDURE — 99213 OFFICE O/P EST LOW 20 MIN: CPT | Performed by: INTERNAL MEDICINE

## 2021-08-18 PROCEDURE — 84443 ASSAY THYROID STIM HORMONE: CPT | Performed by: INTERNAL MEDICINE

## 2021-08-18 PROCEDURE — 84480 ASSAY TRIIODOTHYRONINE (T3): CPT | Performed by: INTERNAL MEDICINE

## 2021-08-18 PROCEDURE — 84439 ASSAY OF FREE THYROXINE: CPT | Performed by: INTERNAL MEDICINE

## 2021-08-19 LAB
T3 SERPL-MCNC: 183 NG/DL (ref 80–200)
T4 FREE SERPL-MCNC: 1.67 NG/DL (ref 0.93–1.7)
TSH SERPL DL<=0.05 MIU/L-ACNC: <0.005 UIU/ML (ref 0.27–4.2)

## 2021-10-11 DIAGNOSIS — E05.90 SUBCLINICAL HYPERTHYROIDISM: Primary | ICD-10-CM

## 2021-10-20 ENCOUNTER — LAB (OUTPATIENT)
Dept: LAB | Facility: HOSPITAL | Age: 41
End: 2021-10-20

## 2021-10-20 DIAGNOSIS — E05.90 SUBCLINICAL HYPERTHYROIDISM: ICD-10-CM

## 2021-10-20 LAB
T3 SERPL-MCNC: 199 NG/DL (ref 80–200)
T4 FREE SERPL-MCNC: 1.94 NG/DL (ref 0.93–1.7)
TSH SERPL DL<=0.05 MIU/L-ACNC: <0.005 UIU/ML (ref 0.27–4.2)

## 2021-10-20 PROCEDURE — 84480 ASSAY TRIIODOTHYRONINE (T3): CPT

## 2021-10-20 PROCEDURE — 84443 ASSAY THYROID STIM HORMONE: CPT

## 2021-10-20 PROCEDURE — 84439 ASSAY OF FREE THYROXINE: CPT

## 2021-10-22 ENCOUNTER — OFFICE VISIT (OUTPATIENT)
Dept: ENDOCRINOLOGY | Facility: CLINIC | Age: 41
End: 2021-10-22

## 2021-10-22 VITALS
DIASTOLIC BLOOD PRESSURE: 68 MMHG | HEART RATE: 82 BPM | SYSTOLIC BLOOD PRESSURE: 118 MMHG | HEIGHT: 61 IN | WEIGHT: 122.7 LBS | OXYGEN SATURATION: 97 % | BODY MASS INDEX: 23.17 KG/M2

## 2021-10-22 DIAGNOSIS — E05.20 TOXIC MULTINODULAR GOITER W/O CRISIS: Primary | ICD-10-CM

## 2021-10-22 PROBLEM — E05.90 SUBCLINICAL HYPERTHYROIDISM: Status: RESOLVED | Noted: 2019-07-24 | Resolved: 2021-10-22

## 2021-10-22 PROCEDURE — 99214 OFFICE O/P EST MOD 30 MIN: CPT | Performed by: INTERNAL MEDICINE

## 2021-10-22 RX ORDER — METHIMAZOLE 5 MG/1
2.5 TABLET ORAL NIGHTLY
Qty: 15 TABLET | Refills: 5 | Status: SHIPPED | OUTPATIENT
Start: 2021-10-22 | End: 2022-03-23 | Stop reason: SDUPTHER

## 2021-10-22 RX ORDER — CYCLOBENZAPRINE HCL 5 MG
5 TABLET ORAL 3 TIMES DAILY
COMMUNITY
Start: 2021-08-19 | End: 2021-10-22

## 2021-12-15 ENCOUNTER — LAB (OUTPATIENT)
Dept: LAB | Facility: HOSPITAL | Age: 41
End: 2021-12-15

## 2021-12-15 DIAGNOSIS — E05.20 TOXIC MULTINODULAR GOITER W/O CRISIS: ICD-10-CM

## 2021-12-15 LAB
T4 FREE SERPL-MCNC: 1 NG/DL (ref 0.93–1.7)
TSH SERPL DL<=0.05 MIU/L-ACNC: 0.11 UIU/ML (ref 0.27–4.2)

## 2021-12-15 PROCEDURE — 84443 ASSAY THYROID STIM HORMONE: CPT

## 2021-12-15 PROCEDURE — 84439 ASSAY OF FREE THYROXINE: CPT

## 2022-03-23 ENCOUNTER — OFFICE VISIT (OUTPATIENT)
Dept: ENDOCRINOLOGY | Facility: CLINIC | Age: 42
End: 2022-03-23

## 2022-03-23 ENCOUNTER — LAB (OUTPATIENT)
Dept: LAB | Facility: HOSPITAL | Age: 42
End: 2022-03-23

## 2022-03-23 VITALS
OXYGEN SATURATION: 99 % | HEART RATE: 76 BPM | SYSTOLIC BLOOD PRESSURE: 114 MMHG | DIASTOLIC BLOOD PRESSURE: 60 MMHG | BODY MASS INDEX: 24.92 KG/M2 | HEIGHT: 61 IN | WEIGHT: 132 LBS

## 2022-03-23 DIAGNOSIS — E05.20 TOXIC MULTINODULAR GOITER W/O CRISIS: Primary | ICD-10-CM

## 2022-03-23 PROCEDURE — 84439 ASSAY OF FREE THYROXINE: CPT | Performed by: INTERNAL MEDICINE

## 2022-03-23 PROCEDURE — 84480 ASSAY TRIIODOTHYRONINE (T3): CPT | Performed by: INTERNAL MEDICINE

## 2022-03-23 PROCEDURE — 99213 OFFICE O/P EST LOW 20 MIN: CPT | Performed by: INTERNAL MEDICINE

## 2022-03-23 PROCEDURE — 84443 ASSAY THYROID STIM HORMONE: CPT | Performed by: INTERNAL MEDICINE

## 2022-03-23 RX ORDER — METHIMAZOLE 5 MG/1
2.5 TABLET ORAL NIGHTLY
Qty: 25 TABLET | Refills: 5 | Status: SHIPPED | OUTPATIENT
Start: 2022-03-23 | End: 2023-03-06

## 2022-03-24 LAB
T3 SERPL-MCNC: 120 NG/DL (ref 80–200)
T4 FREE SERPL-MCNC: 1.18 NG/DL (ref 0.93–1.7)
TSH SERPL DL<=0.05 MIU/L-ACNC: 0.05 UIU/ML (ref 0.27–4.2)

## 2022-08-22 ENCOUNTER — OFFICE VISIT (OUTPATIENT)
Dept: ENDOCRINOLOGY | Facility: CLINIC | Age: 42
End: 2022-08-22

## 2022-08-22 ENCOUNTER — LAB (OUTPATIENT)
Dept: LAB | Facility: HOSPITAL | Age: 42
End: 2022-08-22

## 2022-08-22 VITALS
HEIGHT: 61 IN | SYSTOLIC BLOOD PRESSURE: 118 MMHG | OXYGEN SATURATION: 98 % | HEART RATE: 78 BPM | WEIGHT: 142 LBS | BODY MASS INDEX: 26.81 KG/M2 | DIASTOLIC BLOOD PRESSURE: 62 MMHG

## 2022-08-22 DIAGNOSIS — E05.20 TOXIC MULTINODULAR GOITER W/O CRISIS: Primary | ICD-10-CM

## 2022-08-22 PROCEDURE — 84439 ASSAY OF FREE THYROXINE: CPT | Performed by: INTERNAL MEDICINE

## 2022-08-22 PROCEDURE — 84480 ASSAY TRIIODOTHYRONINE (T3): CPT | Performed by: INTERNAL MEDICINE

## 2022-08-22 PROCEDURE — 84443 ASSAY THYROID STIM HORMONE: CPT | Performed by: INTERNAL MEDICINE

## 2022-08-22 PROCEDURE — 99213 OFFICE O/P EST LOW 20 MIN: CPT | Performed by: INTERNAL MEDICINE

## 2022-08-22 RX ORDER — DIPHENOXYLATE HYDROCHLORIDE AND ATROPINE SULFATE 2.5; .025 MG/1; MG/1
TABLET ORAL DAILY
COMMUNITY

## 2022-08-23 LAB
T3 SERPL-MCNC: 113 NG/DL (ref 80–200)
T4 FREE SERPL-MCNC: 0.99 NG/DL (ref 0.93–1.7)
TSH SERPL DL<=0.05 MIU/L-ACNC: 0.35 UIU/ML (ref 0.27–4.2)

## 2023-03-06 ENCOUNTER — OFFICE VISIT (OUTPATIENT)
Dept: ENDOCRINOLOGY | Facility: CLINIC | Age: 43
End: 2023-03-06
Payer: COMMERCIAL

## 2023-03-06 VITALS
OXYGEN SATURATION: 98 % | HEIGHT: 61 IN | SYSTOLIC BLOOD PRESSURE: 120 MMHG | WEIGHT: 145 LBS | BODY MASS INDEX: 27.38 KG/M2 | HEART RATE: 74 BPM | DIASTOLIC BLOOD PRESSURE: 74 MMHG

## 2023-03-06 DIAGNOSIS — E05.20 TOXIC MULTINODULAR GOITER W/O CRISIS: Primary | ICD-10-CM

## 2023-03-06 LAB
T3 SERPL-MCNC: 162 NG/DL (ref 80–200)
T4 FREE SERPL-MCNC: 1.47 NG/DL (ref 0.93–1.7)
TSH SERPL DL<=0.05 MIU/L-ACNC: <0.005 UIU/ML (ref 0.27–4.2)

## 2023-03-06 PROCEDURE — 84480 ASSAY TRIIODOTHYRONINE (T3): CPT | Performed by: INTERNAL MEDICINE

## 2023-03-06 PROCEDURE — 84439 ASSAY OF FREE THYROXINE: CPT | Performed by: INTERNAL MEDICINE

## 2023-03-06 PROCEDURE — 99213 OFFICE O/P EST LOW 20 MIN: CPT | Performed by: INTERNAL MEDICINE

## 2023-03-06 PROCEDURE — 84443 ASSAY THYROID STIM HORMONE: CPT | Performed by: INTERNAL MEDICINE

## 2023-03-06 NOTE — PROGRESS NOTES
Chief Complaint   Patient presents with   • Hyperthyroidism     Follow up        HPI:   Madai Olmos is a 42 y.o.female who returns to Endocrine Clinic for f/u evaluation of pt's hyperthyroidism due to toxic multinodular goiter. Last visit 08/22/2022. Her history is as follows:    Interim Events:   - pt took methimazole 2.5 mg from 10/2021 to 10/2022. Since stopping the methimazole in October, she has not experienced palpitations or increased anxiety from baseline.  - currently following an intermittent fasting diet plan     1) hyperthyroidism due to TMG:  - pt had a TSH collected in 01/2018 that she reports was slightly decreased. Pt was taking a high dose biotin supplement at that time. Her TSH was noted to be normal in 2017  - She then presented to her PCP with increased anxiety in 01/2019. Evaluation included TFT's that were consistent with subclinical hyperthyroidism: (01/30/2019) TSH 0.01, free T4 1.3 (0.8 - 1.8). Pt recalls no biotin at that time, no URI or other illness.   - no imaging was completed at that time  - she continued to have anxiety, depression and OCD and was eventually prescribed clonazepam 0.5 mg BID. Had been on Zoloft in the past  - Her PCP was concerned that her subclinical hyperthyroidism was contributing to her anxiety prompting consultation.    Initial Endocrine Visit: (07/01/2019):  - labs checked again showed subclinical hyperthyroidism (TSH < 0.005, normal free T4 of 1.49 and total T3 of 167.0)  - the etiology was found to be multinodular goiter  - discussed with patient that it is unlikely her subclinical hyperthyroidism was contributing to her anxiety. However, a trial of methimazole could be considered to see if symptoms improve with normalization of her TSH.   - pt did not want to take methimazole and reported her symptoms had been slowly improving with the clonazepam.    (05/19/2021) Pt presented symptomatic with palpitations and tachycardia. Labs checked showed overt, mild  hyperthyroidism (suppressed TSH <0.005, with an elevated Free T4 of 1.73 and normal total T3 162.0)  - the etiology was multinodular goiter as seen on Thyroid US and Thyroid uptake and scan in 2019. TSH Receptor Ab was negative - no underlying Graves disease.  - discussed with patient that her recent tachycardia and palpitations were due to her toxic nodular goiter. Discussed that she is at risk for cardiac dysrhythmias with untreated hyperthyroidism.Recommended she start methimazole 5 mg daily.   - Pt took methimazole for only 2 weeks. She stopped on 06/02/2021 due to low heart rate in the 60's. Off methimazole pt reported low blood pressure as well as c/o night sweats, right arm/leg numbness. TFT's repeated (06/02/2021) TSH 0.007, free T4 1.11.   (10/20/2021) - pt contacted me in mid October about developing worsening anxiety and palpitations at rest. Checked TFT's on 10/20/2021 which showed overt hyperthyroidism. TSH <0.005, free T4 1.94 (0.93 - 1.70), negative TSH Rec Ab. Started methimazole 2.5 mg nightly Mon - Friday  (10/2021 - 10/2022) - pt took methimazole 2.5 mg from 10/2021 to 10/2022. Since stopping the methimazole in October, she has not experienced palpitations or increased anxiety from baseline.       2) non-obstructive MNG:     Thyroid US completed in clinic (07/2019) showed -   - Asymmetric, mildly enlarged thyroid gland with the right lobe enlarged and the left lobe normal in size. Findings were consistent with a multinodular goiter.   - No discrete nodules were identified in the left lobe or isthmus.  - In the right mid to superior lobe, a 3.79(L) x 2.16(AP) x 3.18(T) cm isoechoic spongiform nodule was identified. The nodule had a smooth margin, peripheral and intranodal vascularity, and no microcalcifications. TI-Rads score of 1.   - In the right mid to inferior lobe, a 3.64(L) x 2.49(AP) x 2.61(T) cm isoechoic mostly solid nodule was identified. The nodule had a smooth margin, minimal peripheral  "vascularity, and no microcalcifications. TI-Rads score of 3.   RECOMMENDATIONS: Although the right mid to inferior lobe nodule meets criteria for FNA based on size, FNA would not be recommended if the nodule is autonomous. Based on the suppressed TSH, recommended NM Thyroid uptake and scan to further characterize the nodules.     NM Thyroid Uptake and Scan (08/08/2019,  radiology): \"1. Focal uptake in the right thyroid lobe and suppressed appearance of left thyroid lobe.2. Four hour thyroid uptake at the far upper limits of normal, this combination of findings suggest toxic nodular goiter.\"  \"Thyroid uptake is at the far upper limits of normal for 4 hours at 20%,and mid range normal at 24 hours at 27%\"     Thyroid US completed in clinic (05/2021) showed -   - Asymmetric, mildly enlarged thyroid gland with the right lobe enlarged and the left lobe normal in size. Findings were consistent with a multinodular goiter.   - A subcentimeter (0.56 cm) isoechoic mixed solid and cystic nodule was identified.  - In the right mid to superior lobe, a 3.96(L) x 2.07(AP) x 2.97(T) cm isoechoic spongiform nodule was again identified. The nodule had a smooth margin, peripheral and intranodal vascularity, and no microcalcifications. TI-Rads score of 1.   - In the right mid to inferior lobe, a 3.84(L) x 3.11(AP) x 3.01(T) cm isoechoic mostly solid nodule with a few small cystic areas at the periphery was again identified. The nodule had a smooth margin, peripheral vascularity, and no microcalcifications.  TI-Rads score of 3.   RECOMMENDATIONS: Although the right mid to inferior lobe nodule meets criteria for FNA based on size, FNA was not recommended as the nodule is autonomous.     FMH: no known thyroid cancer, no known thyroid disease. (Grandmother had PHPTH)  PMH: no h/o irradiation of head, neck, or chest    Review of Systems   Constitutional:        Wt stable   HENT: Negative.    Eyes: Negative.    Respiratory: Negative.  " "  Cardiovascular: Negative.  Negative for palpitations.   Gastrointestinal: Negative.         H/o Frequent heartburn   Endocrine: Negative.         Occasional night sweats in the last 4 wks   Genitourinary: Negative.    Musculoskeletal: Negative for myalgias.   Skin: Negative.    Allergic/Immunologic: Negative.    Neurological: Negative for tremors.   Hematological: Negative.    Psychiatric/Behavioral: Negative.  Negative for suicidal ideas.        No increased anxiety     The following portions of the patient's history were reviewed and updated as appropriate: allergies, current medications, past family history, past medical history, past social history, past surgical history and problem list.    /74   Pulse 74   Ht 154.9 cm (61\")   Wt 65.8 kg (145 lb)   SpO2 98%   BMI 27.40 kg/m²   Physical Exam   Constitutional: She is oriented to person, place, and time. She appears well-developed. No distress.   HENT:   Head: Normocephalic.   Eyes: Pupils are equal, round, and reactive to light. Conjunctivae are normal.   Neck: No tracheal deviation present. Thyromegaly (right lobe enlarged, left lobe small) present.   + palpable thyroid nodules in right lobe (superior and mid lobe)     Cardiovascular: Normal rate, regular rhythm and normal heart sounds.   No murmur heard.  Pulmonary/Chest: Effort normal and breath sounds normal. No respiratory distress.   Abdominal: Soft. Bowel sounds are normal. She exhibits no mass. There is no abdominal tenderness.   Lymphadenopathy:     She has no cervical adenopathy.   Neurological: She is alert and oriented to person, place, and time. No cranial nerve deficit.   Skin: Skin is warm and dry. She is not diaphoretic. No erythema.   Psychiatric: Her behavior is normal.   Vitals reviewed.    LABS/IMAGING: outside records reviewed and summarized in HPI  Results for orders placed or performed in visit on 03/06/23   TSH    Specimen: Arm, Left; Blood   Result Value Ref Range    TSH " <0.005 (L) 0.270 - 4.200 uIU/mL   T3    Specimen: Arm, Left; Blood   Result Value Ref Range    T3, Total 162.0 80.0 - 200.0 ng/dl   T4, Free    Specimen: Arm, Left; Blood   Result Value Ref Range    Free T4 1.47 0.93 - 1.70 ng/dL       ASSESSMENT/PLAN:   1) subclinical hyperthyroidism due to toxic nodular goiter   - Pt clinically euthyroid on exam  - Treated with methimazole in the past from 10/2021 - 10/2022 when she developed overt hyperthyroidism  - Labs today show recurrence of subclinical hyperthyroidism  - Patient reported she is asymptomatic at this time.  Per current guidelines, he does not meet criteria for treatment based on her age, lack of underlying cardiac conditions, and lack of symptoms.   -Plan will be to monitor her TFTs at this time.  She was instructed to contact me as soon as possible if she develops symptoms concerning for overt hyperthyroidism.    RTC 12 months    Signed: iRtu Marino MD

## 2024-03-06 ENCOUNTER — OFFICE VISIT (OUTPATIENT)
Dept: ENDOCRINOLOGY | Facility: CLINIC | Age: 44
End: 2024-03-06
Payer: COMMERCIAL

## 2024-03-06 VITALS
DIASTOLIC BLOOD PRESSURE: 62 MMHG | BODY MASS INDEX: 25.11 KG/M2 | HEART RATE: 86 BPM | OXYGEN SATURATION: 98 % | SYSTOLIC BLOOD PRESSURE: 120 MMHG | HEIGHT: 61 IN | WEIGHT: 133 LBS

## 2024-03-06 DIAGNOSIS — E05.20 TOXIC MULTINODULAR GOITER W/O CRISIS: Primary | ICD-10-CM

## 2024-03-06 PROCEDURE — 99213 OFFICE O/P EST LOW 20 MIN: CPT | Performed by: INTERNAL MEDICINE

## 2024-03-06 RX ORDER — MULTIVIT WITH MINERALS/LUTEIN
250 TABLET ORAL DAILY
COMMUNITY

## 2024-03-06 RX ORDER — FLUTICASONE PROPIONATE 50 MCG
SPRAY, SUSPENSION (ML) NASAL
COMMUNITY
Start: 2024-01-29

## 2024-03-06 RX ORDER — IBUPROFEN 800 MG/1
800 TABLET ORAL EVERY 6 HOURS PRN
COMMUNITY

## 2024-03-06 RX ORDER — TRETINOIN 0.025 %
CREAM (GRAM) TOPICAL
COMMUNITY
Start: 2023-11-15

## 2024-03-06 RX ORDER — CYCLOBENZAPRINE HCL 10 MG
TABLET ORAL
COMMUNITY
Start: 2024-02-27

## 2024-03-06 NOTE — PROGRESS NOTES
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 Last visit 03/06/2023. Her history is as follows:    Interim Events:   - pt took methimazole 2.5 mg from 10/2021 to 10/2022. Since stopping the methimazole in October, she has not experienced palpitations or increased anxiety from baseline.  - no new health issues  - has not had to take any medications for anxiety since last visit  - had labs at outside facility on 03/04/2024, see below     1) hyperthyroidism due to TMG:  - pt had a TSH collected in 01/2018 that she reports was slightly decreased. Pt was taking a high dose biotin supplement at that time. Her TSH was noted to be normal in 2017  - She then presented to her PCP with increased anxiety in 01/2019. Evaluation included TFT's that were consistent with subclinical hyperthyroidism: (01/30/2019) TSH 0.01, free T4 1.3 (0.8 - 1.8). Pt recalls no biotin at that time, no URI or other illness.   - no imaging was completed at that time  - she continued to have anxiety, depression and OCD and was eventually prescribed clonazepam 0.5 mg BID. Had been on Zoloft in the past  - Her PCP was concerned that her subclinical hyperthyroidism was contributing to her anxiety prompting consultation.    Initial Endocrine Visit: (07/01/2019):  - labs checked again showed subclinical hyperthyroidism (TSH < 0.005, normal free T4 of 1.49 and total T3 of 167.0)  - the etiology was found to be multinodular goiter  - discussed with patient that it is unlikely her subclinical hyperthyroidism was contributing to her anxiety. However, a trial of methimazole could be considered to see if symptoms improve with normalization of her TSH.   - pt did not want to take methimazole and reported her symptoms had been slowly improving with the  clonazepam.    (05/19/2021) Pt presented symptomatic with palpitations and tachycardia. Labs checked showed overt, mild hyperthyroidism (suppressed TSH <0.005, with an elevated Free T4 of 1.73 and normal total T3 162.0)  - the etiology was multinodular goiter as seen on Thyroid US and Thyroid uptake and scan in 2019. TSH Receptor Ab was negative - no underlying Graves disease.  - discussed with patient that her recent tachycardia and palpitations were due to her toxic nodular goiter. Discussed that she is at risk for cardiac dysrhythmias with untreated hyperthyroidism.Recommended she start methimazole 5 mg daily.   - Pt took methimazole for only 2 weeks. She stopped on 06/02/2021 due to low heart rate in the 60's. Off methimazole pt reported low blood pressure as well as c/o night sweats, right arm/leg numbness. TFT's repeated (06/02/2021) TSH 0.007, free T4 1.11.   (10/20/2021) - pt contacted me in mid October about developing worsening anxiety and palpitations at rest. Checked TFT's on 10/20/2021 which showed overt hyperthyroidism. TSH <0.005, free T4 1.94 (0.93 - 1.70), negative TSH Rec Ab. Started methimazole 2.5 mg nightly Mon - Friday  (10/2021 - 10/2022) - pt took methimazole 2.5 mg from 10/2021 to 10/2022. Since stopping the methimazole in October, she has not experienced palpitations or increased anxiety from baseline.       2) non-obstructive MNG:     Thyroid US completed in clinic (07/2019) showed -   - Asymmetric, mildly enlarged thyroid gland with the right lobe enlarged and the left lobe normal in size. Findings were consistent with a multinodular goiter.   - No discrete nodules were identified in the left lobe or isthmus.  - In the right mid to superior lobe, a 3.79(L) x 2.16(AP) x 3.18(T) cm isoechoic spongiform nodule was identified. The nodule had a smooth margin, peripheral and intranodal vascularity, and no microcalcifications. TI-Rads score of 1.   - In the right mid to inferior lobe, a 3.64(L)  "x 2.49(AP) x 2.61(T) cm isoechoic mostly solid nodule was identified. The nodule had a smooth margin, minimal peripheral vascularity, and no microcalcifications. TI-Rads score of 3.   RECOMMENDATIONS: Although the right mid to inferior lobe nodule meets criteria for FNA based on size, FNA would not be recommended if the nodule is autonomous. Based on the suppressed TSH, recommended NM Thyroid uptake and scan to further characterize the nodules.     NM Thyroid Uptake and Scan (08/08/2019,  radiology): \"1. Focal uptake in the right thyroid lobe and suppressed appearance of left thyroid lobe.2. Four hour thyroid uptake at the far upper limits of normal, this combination of findings suggest toxic nodular goiter.\"  \"Thyroid uptake is at the far upper limits of normal for 4 hours at 20%,and mid range normal at 24 hours at 27%\"     Thyroid US completed in clinic (05/2021) showed -   - Asymmetric, mildly enlarged thyroid gland with the right lobe enlarged and the left lobe normal in size. Findings were consistent with a multinodular goiter.   - A subcentimeter (0.56 cm) isoechoic mixed solid and cystic nodule was identified.  - In the right mid to superior lobe, a 3.96(L) x 2.07(AP) x 2.97(T) cm isoechoic spongiform nodule was again identified. The nodule had a smooth margin, peripheral and intranodal vascularity, and no microcalcifications. TI-Rads score of 1.   - In the right mid to inferior lobe, a 3.84(L) x 3.11(AP) x 3.01(T) cm isoechoic mostly solid nodule with a few small cystic areas at the periphery was again identified. The nodule had a smooth margin, peripheral vascularity, and no microcalcifications.  TI-Rads score of 3.   RECOMMENDATIONS: Although the right mid to inferior lobe nodule meets criteria for FNA based on size, FNA was not recommended as the nodule is autonomous.     FMH: no known thyroid cancer, no known thyroid disease. (Grandmother had PHPTH)  PMH: no h/o irradiation of head, neck, or " "chest    Review of Systems   Constitutional:         Wt loss, mild since last year.    HENT: Negative.     Eyes: Negative.    Respiratory: Negative.     Cardiovascular: Negative.  Negative for palpitations.   Gastrointestinal: Negative.         H/o Frequent heartburn   Endocrine: Negative.    Genitourinary: Negative.    Musculoskeletal:  Negative for myalgias.   Skin: Negative.    Allergic/Immunologic: Negative.    Neurological:  Negative for tremors.   Hematological: Negative.    Psychiatric/Behavioral: Negative.  Negative for suicidal ideas.         No increased anxiety       The following portions of the patient's history were reviewed and updated as appropriate: allergies, current medications, past family history, past medical history, past social history, past surgical history and problem list.      /62   Pulse 86   Ht 154.9 cm (61\")   Wt 60.3 kg (133 lb)   SpO2 98%   BMI 25.13 kg/m²   Physical Exam   Constitutional: She is oriented to person, place, and time. She appears well-developed. No distress.   HENT:   Head: Normocephalic.   Eyes: Pupils are equal, round, and reactive to light. Conjunctivae are normal.   Neck: No tracheal deviation present. Thyromegaly (right lobe enlarged, left lobe small) present.   + palpable thyroid nodules in right lobe (superior and mid lobe)     Cardiovascular: Normal rate, regular rhythm and normal heart sounds.   No murmur heard.  Pulmonary/Chest: Effort normal and breath sounds normal. No respiratory distress.   Abdominal: Soft. Bowel sounds are normal. She exhibits no mass. There is no abdominal tenderness.   Lymphadenopathy:     She has no cervical adenopathy.   Neurological: She is alert and oriented to person, place, and time. No cranial nerve deficit.   Skin: Skin is warm and dry. She is not diaphoretic. No erythema.   Psychiatric: Her behavior is normal.   Vitals reviewed.    LABS/IMAGING: outside records reviewed and summarized in HPI  (03/04/2024)   TSH " 0.01, free T4 1.2 (0.8 - 1.8)     ASSESSMENT/PLAN:   1) subclinical hyperthyroidism due to toxic nodular goiter   - Pt clinically euthyroid on exam  - Treated with methimazole in the past from 10/2021 - 10/2022 when she developed overt hyperthyroidism  - Outside labs from 03/04/2024  show continued subclinical hyperthyroidism  - Patient reported she is asymptomatic at this time.  Per current guidelines, she does not meet criteria for treatment based on her age, lack of underlying cardiac conditions, and lack of symptoms.   -Plan will be to monitor her TFTs at this time.  She was instructed to contact me as soon as possible if she develops symptoms concerning for overt hyperthyroidism.    - Briefly looked at her gland with clinic US which showed her that the right mid to inf lobe nodule now has a isoechoic spongiform appearance. The right superior lobe nodule also has a spongiform appearance. The nodules are slightly larger in comparison to prior imaging. Will complete a formal US at her f/u visit. Discussed with patient that these nodules have TI-RADS Level 1 scoring and do not meet criteria for FNA.     RTC 12 months    Electronically Signed: Ritu Marino MD

## 2025-03-10 ENCOUNTER — OFFICE VISIT (OUTPATIENT)
Dept: ENDOCRINOLOGY | Facility: CLINIC | Age: 45
End: 2025-03-10
Payer: COMMERCIAL

## 2025-03-10 VITALS
OXYGEN SATURATION: 99 % | WEIGHT: 137 LBS | SYSTOLIC BLOOD PRESSURE: 122 MMHG | HEIGHT: 60 IN | BODY MASS INDEX: 26.9 KG/M2 | HEART RATE: 95 BPM | DIASTOLIC BLOOD PRESSURE: 72 MMHG

## 2025-03-10 DIAGNOSIS — E05.20 TOXIC MULTINODULAR GOITER W/O CRISIS: Primary | ICD-10-CM

## 2025-03-10 LAB
T3 SERPL-MCNC: 171 NG/DL (ref 80–200)
T4 FREE SERPL-MCNC: 1.47 NG/DL (ref 0.92–1.68)
TSH SERPL DL<=0.05 MIU/L-ACNC: <0.005 UIU/ML (ref 0.27–4.2)

## 2025-03-10 PROCEDURE — 84443 ASSAY THYROID STIM HORMONE: CPT | Performed by: INTERNAL MEDICINE

## 2025-03-10 PROCEDURE — 84480 ASSAY TRIIODOTHYRONINE (T3): CPT | Performed by: INTERNAL MEDICINE

## 2025-03-10 PROCEDURE — 84439 ASSAY OF FREE THYROXINE: CPT | Performed by: INTERNAL MEDICINE

## 2025-03-10 NOTE — PROGRESS NOTES
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 Last visit 03/06/2024. Her history is as follows:    Interim Events:   - no new health issues  - has not had to take any medications for anxiety since last visit  - wt has been stable and her menses has been regular     1) hyperthyroidism due to TMG:  - pt had a TSH collected in 01/2018 that she reports was slightly decreased. Pt was taking a high dose biotin supplement at that time. Her TSH was noted to be normal in 2017  - She then presented to her PCP with increased anxiety in 01/2019. Evaluation included TFT's that were consistent with subclinical hyperthyroidism: (01/30/2019) TSH 0.01, free T4 1.3 (0.8 - 1.8). Pt recalls no biotin at that time, no URI or other illness.   - no imaging was completed at that time  - she continued to have anxiety, depression and OCD and was eventually prescribed clonazepam 0.5 mg BID. Had been on Zoloft in the past  - Her PCP was concerned that her subclinical hyperthyroidism was contributing to her anxiety prompting consultation.    Initial Endocrine Visit: (07/01/2019):  - labs checked again showed subclinical hyperthyroidism (TSH < 0.005, normal free T4 of 1.49 and total T3 of 167.0)  - the etiology was found to be multinodular goiter  - discussed with patient that it is unlikely her subclinical hyperthyroidism was contributing to her anxiety. However, a trial of methimazole could be considered to see if symptoms improve with normalization of her TSH.   - pt did not want to take methimazole and reported her symptoms had been slowly improving with the clonazepam.    (05/19/2021) Pt presented symptomatic with palpitations and tachycardia. Labs checked showed overt, mild hyperthyroidism (suppressed TSH <0.005, with an  elevated Free T4 of 1.73 and normal total T3 162.0)  - the etiology was multinodular goiter as seen on Thyroid US and Thyroid uptake and scan in 2019. TSH Receptor Ab was negative - no underlying Graves disease.  - discussed with patient that her recent tachycardia and palpitations were due to her toxic nodular goiter. Discussed that she is at risk for cardiac dysrhythmias with untreated hyperthyroidism.Recommended she start methimazole 5 mg daily.   - Pt took methimazole for only 2 weeks. She stopped on 06/02/2021 due to low heart rate in the 60's. Off methimazole pt reported low blood pressure as well as c/o night sweats, right arm/leg numbness. TFT's repeated (06/02/2021) TSH 0.007, free T4 1.11.   (10/20/2021) - pt contacted me in mid October about developing worsening anxiety and palpitations at rest. Checked TFT's on 10/20/2021 which showed overt hyperthyroidism. TSH <0.005, free T4 1.94 (0.93 - 1.70), negative TSH Rec Ab. Started methimazole 2.5 mg nightly Mon - Friday  (10/2021 - 10/2022) - pt took methimazole 2.5 mg from 10/2021 to 10/2022. Since stopping the methimazole in October, she has not experienced palpitations or increased anxiety from baseline.       - has not had to take any medications for anxiety since last visit  - wt has been stable and her menses has been regular       2) non-obstructive MNG:     Thyroid US completed in clinic (07/2019) showed -   - Asymmetric, mildly enlarged thyroid gland with the right lobe enlarged and the left lobe normal in size. Findings were consistent with a multinodular goiter.   - No discrete nodules were identified in the left lobe or isthmus.  - In the right mid to superior lobe, a 3.79(L) x 2.16(AP) x 3.18(T) cm isoechoic spongiform nodule was identified. The nodule had a smooth margin, peripheral and intranodal vascularity, and no microcalcifications. TI-Rads score of 1.   - In the right mid to inferior lobe, a 3.64(L) x 2.49(AP) x 2.61(T) cm isoechoic mostly  "solid nodule was identified. The nodule had a smooth margin, minimal peripheral vascularity, and no microcalcifications. TI-Rads score of 3.   RECOMMENDATIONS: Although the right mid to inferior lobe nodule meets criteria for FNA based on size, FNA would not be recommended if the nodule is autonomous. Based on the suppressed TSH, recommended NM Thyroid uptake and scan to further characterize the nodules.     NM Thyroid Uptake and Scan (08/08/2019,  radiology): \"1. Focal uptake in the right thyroid lobe and suppressed appearance of left thyroid lobe.2. Four hour thyroid uptake at the far upper limits of normal, this combination of findings suggest toxic nodular goiter.\"  \"Thyroid uptake is at the far upper limits of normal for 4 hours at 20%,and mid range normal at 24 hours at 27%\"     Thyroid US completed in clinic (05/2021) showed -   - Asymmetric, mildly enlarged thyroid gland with the right lobe enlarged and the left lobe normal in size. Findings were consistent with a multinodular goiter.   - A subcentimeter (0.56 cm) isoechoic mixed solid and cystic nodule was identified.  - In the right mid to superior lobe, a 3.96(L) x 2.07(AP) x 2.97(T) cm isoechoic spongiform nodule was again identified. The nodule had a smooth margin, peripheral and intranodal vascularity, and no microcalcifications. TI-Rads score of 1.   - In the right mid to inferior lobe, a 3.84(L) x 3.11(AP) x 3.01(T) cm isoechoic mostly solid nodule with a few small cystic areas at the periphery was again identified. The nodule had a smooth margin, peripheral vascularity, and no microcalcifications.  TI-Rads score of 3.   RECOMMENDATIONS: Although the right mid to inferior lobe nodule meets criteria for FNA based on size, FNA was not recommended as the nodule is autonomous.     (03/2024) - Briefly looked at her gland with clinic US which showed her that the right mid to inf lobe nodule now has a isoechoic spongiform appearance. The right superior " "lobe nodule also has a spongiform appearance. The nodules are slightly larger in comparison to prior imaging. Will complete a formal US at at later date. Discussed with patient that these nodules have TI-RADS Level 1 scoring and do not meet criteria for FNA.     FMH: no known thyroid cancer, no known thyroid disease. (Grandmother had PHPTH)  PMH: no h/o irradiation of head, neck, or chest    Review of Systems   Constitutional:         Wt stable   HENT: Negative.     Eyes: Negative.    Respiratory: Negative.     Cardiovascular: Negative.  Negative for palpitations.   Gastrointestinal: Negative.         H/o Frequent heartburn   Endocrine: Negative.    Genitourinary: Negative.    Musculoskeletal:  Negative for myalgias.   Skin: Negative.    Allergic/Immunologic: Negative.    Neurological:  Negative for tremors.   Hematological: Negative.    Psychiatric/Behavioral: Negative.  Negative for suicidal ideas.         No increased anxiety     The following portions of the patient's history were reviewed and updated as appropriate: allergies, current medications, past family history, past medical history, past social history, past surgical history and problem list.    /72 (BP Location: Left arm, Patient Position: Sitting, Cuff Size: Adult)   Pulse 95   Ht 152.4 cm (60\")   Wt 62.1 kg (137 lb)   SpO2 99%   BMI 26.76 kg/m²   Physical Exam   Constitutional: She is oriented to person, place, and time. She appears well-developed. No distress.   HENT:   Head: Normocephalic.   Eyes: Pupils are equal, round, and reactive to light. Conjunctivae are normal.   Neck: No tracheal deviation present. Thyromegaly (right lobe enlarged, left lobe small) present.   + palpable thyroid nodules in right lobe (superior and mid lobe)     Cardiovascular: Normal rate, regular rhythm and normal heart sounds.   No murmur heard.  Pulmonary/Chest: Effort normal and breath sounds normal. No respiratory distress.   Abdominal: Soft. Bowel sounds are " normal. She exhibits no mass. There is no abdominal tenderness.   Lymphadenopathy:     She has no cervical adenopathy.   Neurological: She is alert and oriented to person, place, and time. No cranial nerve deficit.   Skin: Skin is warm and dry. She is not diaphoretic. No erythema.   Psychiatric: Her behavior is normal.   Vitals reviewed.    LABS/IMAGING: outside records reviewed and summarized in HPI  Office Visit on 03/10/2025   Component Date Value Ref Range Status    TSH 03/10/2025 <0.005 (L)  0.270 - 4.200 uIU/mL Final    Free T4 03/10/2025 1.47  0.92 - 1.68 ng/dL Final    T3, Total 03/10/2025 171.0  80.0 - 200.0 ng/dl Final         ASSESSMENT/PLAN:   1) subclinical hyperthyroidism due to toxic nodular goiter   - Pt clinically euthyroid on exam  - Treated with methimazole in the past from 10/2021 - 10/2022 when she developed overt hyperthyroidism  - Labs today show continued subclinical hyperthyroidism  - Patient reported she is asymptomatic at this time.  Per current guidelines, she does not meet criteria for treatment based on her age, lack of underlying cardiac conditions, and lack of symptoms.   -Plan will be to monitor her TFTs at this time.  She was instructed to contact me as soon as possible if she develops symptoms concerning for overt hyperthyroidism.    RTC 12 months    Electronically Signed: Ritu Marino MD

## 2025-03-30 ENCOUNTER — RESULTS FOLLOW-UP (OUTPATIENT)
Dept: ENDOCRINOLOGY | Facility: CLINIC | Age: 45
End: 2025-03-30
Payer: COMMERCIAL